# Patient Record
(demographics unavailable — no encounter records)

---

## 2024-11-15 NOTE — HISTORY OF PRESENT ILLNESS
[FreeTextEntry1] : abd pain [de-identified] : episodic upper abdominal pain--post prandial--has for yrs--worse recently--last episode had some elevated home bps and went back on lisinipril last episode of pain 2 wks ago

## 2025-02-14 NOTE — PHYSICAL EXAM
[No Acute Distress] : no acute distress [Well Nourished] : well nourished Detail Level: Simple [Well Developed] : well developed Quality 224: Stage 0-Iic Melanoma: Overutilization Of Imaging Studies For Only Stage 0-Iic Melanoma: None of the following diagnostic imaging studies ordered: chest X-ray, CT, Ultrasound, MRI, PET, or nuclear medicine scans (ML) [Well-Appearing] : well-appearing Quality 137: Melanoma: Continuity Of Care - Recall System: Patient information entered into a recall system that includes: target date for the next exam specified AND a process to follow up with patients regarding missed or unscheduled appointments [Soft] : abdomen soft Detail Level: Zone [Non Tender] : non-tender [Non-distended] : non-distended [Normal Gait] : normal gait [Normal Affect] : the affect was normal [Alert and Oriented x3] : oriented to person, place, and time [Normal Insight/Judgement] : insight and judgment were intact [Normal] : affect was normal and insight and judgment were intact [No Joint Swelling] : no joint swelling Detail Level: Generalized [Grossly Normal Strength/Tone] : grossly normal strength/tone

## 2025-02-14 NOTE — HISTORY OF PRESENT ILLNESS
[FreeTextEntry1] : I am here for medical clearance for a gallbladder removal [de-identified] :  Ms. Fields presents today for medical clearance for impending cholecystectomy she informs that she is feeling well, no acute distress or complaints. She does inform that lately she has been experiencing pain to her right hip area, posteriorly. The pain used to be intermittent but now it has become chronic. It is most notable when she steps down during normal ambulation. She denies know injury. Denies limp.